# Patient Record
Sex: FEMALE
[De-identification: names, ages, dates, MRNs, and addresses within clinical notes are randomized per-mention and may not be internally consistent; named-entity substitution may affect disease eponyms.]

---

## 2019-10-10 ENCOUNTER — APPOINTMENT (OUTPATIENT)
Dept: ORTHOPEDIC SURGERY | Facility: CLINIC | Age: 19
End: 2019-10-10

## 2019-10-10 PROBLEM — Z00.00 ENCOUNTER FOR PREVENTIVE HEALTH EXAMINATION: Status: ACTIVE | Noted: 2019-10-10

## 2019-10-14 ENCOUNTER — APPOINTMENT (OUTPATIENT)
Dept: ORTHOPEDIC SURGERY | Facility: CLINIC | Age: 19
End: 2019-10-14

## 2024-07-11 ENCOUNTER — NURSE TRIAGE (OUTPATIENT)
Age: 24
End: 2024-07-11

## 2024-07-11 ENCOUNTER — TELEPHONE (OUTPATIENT)
Age: 24
End: 2024-07-11

## 2024-07-11 NOTE — TELEPHONE ENCOUNTER
"Previously seen at [facility]. 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  H/O BV last month, did not complete all of treatment.    Would like appointment for evaluation of anal/vaginal irritation.     No sooner appt available, added to wait list.  Encouraged to be evaluated in urgent care if becomes worse.     Reason for Disposition   Nursing judgment     Ongoing symptoms x one month    Answer Assessment - Initial Assessment Questions  1. REASON FOR CALL: \"What is your main concern right now?\"      Anal irritation  2. ONSET: \"When did the irritation start?\"      Last month  3. SEVERITY: \"How bad is the irritation?\"      Mild discomfort  4. FEVER: \"Do you have a fever?\"      Denies  5. OTHER SYMPTOMS: \"Do you have any other new symptoms?\"      Normal vaginal discharge, tiny vaginal bumps that resolve on their own  6. INTERVENTIONS AND RESPONSE: \"What have you done so far to try to make this better? What medications have you used?\"      nothing  7. PREGNANCY: \"Is there any chance you are pregnant?\"      LMP-    Protocols used: No Protocol Available-ADULT-[place]    "

## 2024-07-11 NOTE — TELEPHONE ENCOUNTER
Regarding: issue  ----- Message from Manda SHAH sent at 7/11/2024  4:03 PM EDT -----  Patient called in regarding trying to get a sooner appointment then the one she has 7/17 do to an issue she is having. She would like to try and come in on Monday or Tuesday if possible.

## 2024-07-17 ENCOUNTER — OFFICE VISIT (OUTPATIENT)
Dept: OBGYN CLINIC | Facility: CLINIC | Age: 24
End: 2024-07-17
Payer: COMMERCIAL

## 2024-07-17 VITALS
BODY MASS INDEX: 22.97 KG/M2 | SYSTOLIC BLOOD PRESSURE: 118 MMHG | WEIGHT: 151.6 LBS | DIASTOLIC BLOOD PRESSURE: 68 MMHG | HEIGHT: 68 IN

## 2024-07-17 DIAGNOSIS — R89.4 POSITIVE TEST FOR HERPES SIMPLEX VIRUS (HSV) ANTIBODY: Primary | ICD-10-CM

## 2024-07-17 DIAGNOSIS — N76.0 RECURRENT VAGINITIS: ICD-10-CM

## 2024-07-17 PROCEDURE — 87660 TRICHOMONAS VAGIN DIR PROBE: CPT | Performed by: NURSE PRACTITIONER

## 2024-07-17 PROCEDURE — 87510 GARDNER VAG DNA DIR PROBE: CPT | Performed by: NURSE PRACTITIONER

## 2024-07-17 PROCEDURE — 99203 OFFICE O/P NEW LOW 30 MIN: CPT | Performed by: NURSE PRACTITIONER

## 2024-07-17 PROCEDURE — 87480 CANDIDA DNA DIR PROBE: CPT | Performed by: NURSE PRACTITIONER
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
## 2024-07-18 DIAGNOSIS — B96.89 BACTERIAL VAGINOSIS: Primary | ICD-10-CM

## 2024-07-18 DIAGNOSIS — N76.0 BACTERIAL VAGINOSIS: Primary | ICD-10-CM

## 2024-07-18 LAB
CANDIDA RRNA VAG QL PROBE: NOT DETECTED
G VAGINALIS RRNA GENITAL QL PROBE: DETECTED
T VAGINALIS RRNA GENITAL QL PROBE: NOT DETECTED

## 2024-07-18 RX ORDER — METRONIDAZOLE 7.5 MG/G
1 GEL VAGINAL DAILY
Qty: 70 G | Refills: 0 | Status: SHIPPED | OUTPATIENT
Start: 2024-07-18 | End: 2024-07-23

## 2024-08-07 ENCOUNTER — RA CDI HCC (OUTPATIENT)
Dept: OTHER | Facility: HOSPITAL | Age: 24
End: 2024-08-07

## 2025-07-03 ENCOUNTER — APPOINTMENT (OUTPATIENT)
Dept: URGENT CARE | Age: 25
End: 2025-07-03